# Patient Record
(demographics unavailable — no encounter records)

---

## 2024-10-08 NOTE — PHYSICAL EXAM
[Chaperone Present] : A chaperone was present in the examining room during all aspects of the physical examination [TextEntry] : ***General*** General Appearance: normal; Judgment and insight: normal; the patient is oriented to time, place and person; Recent and remote memory: normal; Mood and affect: normal; Respiratory effort: normal.  ***Pelvic Examination*** External genitalia: the appearance and hair distribution are normal; no lesions are appreciated. Urethra/urethral meatus: no masses or tenderness are appreciated; there is no scarring noted. Bladder: nontender; there is no fullness appreciated; no masses were palpated. Vagina: the vagina is normally rugated; a scanty discharge is seen; no lesions are seen; pelvic support is adequate without any evidence of cystocele or rectocele. Cervix: normal in appearance; no overt lesions are seen. Uterus: Anteverted; normal in size, mobile, nontender, and well supported. Adnexa/parametria: nontender and not enlarged; no masses are palpated. A stool specimen was not indicated at this time.  ***Vaginal Discharge Evaluation*** A saline wet mount and KOH slide evaluation of the vaginal discharge were done. The discharge was scanty; the pH was normal; the whiff test was negative; clue cells were not seen; hyphae were not seen; Lactobacilli were seen; a few white blood cells were seen; superficial cells were seen; a candida culture was sent.  ***colposcopy of the vulva*** Colposcopy of the external genitalia showed that the labia majora and labia minora were normal. She identified the introitus as the location of her pain/itching/irritation. There was no vestibular tenderness of the anterior minor vestibular glands, and no vestibular tenderness of the posterior minor vestibular glands. There was no evidence of other dermatopathology. There was mild erythema of the introitus.

## 2024-10-08 NOTE — ASSESSMENT
[TextEntry] : The patient has a history of vulvar itching and burning.  At least some of her yeast cultures were positive but most recent cultures were negative.  She minimized (but not completely eliminated) irritants and her itching mostly improved.  She continues to complain of some itching but it is not disruptive. 22 minutes of total time was spent on the date of the encounter. This included time for review of medical records and laboratory results, face to face time (including the physical examination counseling and coordination of care), time for patient education, treatment plans, answering questions, communicating with other doctors and for medical record documentation.  The time spent is separate from time spent on separately billed procedures.

## 2024-10-08 NOTE — HISTORY OF PRESENT ILLNESS
[FreeTextEntry1] : The patient has had vulvar itching and burning for the past few months without a vaginal discharge or odor. Initially, cultures were positive for Candida and she was treated with 3 doses of fluconazole on 3 separate occasions including once weekly fluconazole suppression (last dose 10 days prior to her last visit). But her most recent cultures were negative for Candida (including the one done at her last visit). The patient nevertheless was treated with Terazol 3. There is no evidence of Candida on wet prep or culture.  She minimized but did not completely eliminate irritants (she sweats a lot and felt that she would not be clean if she got rid of soap).  She is mostly better but continues to have occasional mild itching.

## 2024-10-08 NOTE — PLAN
[FreeTextEntry1] : I recommended that she continue to try to eliminate the remaining irritants that she has not already eliminated (primarily soap).  She should call for the results of her yeast culture in 1 week.  She should return as needed.

## 2025-04-16 NOTE — ASSESSMENT
[FreeTextEntry1] : Recent increase severe pain for past few weeks with diff bearing wt. Using cane SHe has been doing a hep and taking nsaids with no relief. Pain is progressing with diff moving  We discussed case and findings.  She is indicated for an MRI to eval for stress fracture, oa, sce and ocd SHe will fu after  Diclofenac oral and topical for pain Conside accupuncture

## 2025-04-16 NOTE — PHYSICAL EXAM
[Right] : right foot and ankle [] : not able to perform single heel raise [FreeTextEntry9] : guarding limiting exam [de-identified] : limited due to pain

## 2025-04-16 NOTE — HISTORY OF PRESENT ILLNESS
[10] : 10 [Dull/Aching] : dull/aching [Throbbing] : throbbing [de-identified] :  04/16/2025: patient states she has pain in her right ankle and foot since last week, wb in sneakers, no recent injuries   States pain after fx years ago. SHe does not have rsd, just flares with weather changes. Recent increase severe pain for past few weeks with diff bearing wt. Using cane SHe has been doing a hep and taking nsaids with no relief. Pain is progressing with diff moving  [FreeTextEntry1] : right ankle/ foot

## 2025-04-29 NOTE — DISCUSSION/SUMMARY
[de-identified] : Possible CRPS  Will get EMG to evaluate for neuropathy.  Lidoderm patches sent to pharmacy. OOW note at this time.

## 2025-04-29 NOTE — PHYSICAL EXAM
[NL (40)] : plantar flexion 40 degrees [NL (20)] : eversion 20 degrees [2+] : posterior tibialis pulse: 2+ [] : ambulation with cane [Right] : right ankle [Weight -] : weightbearing [There are no fractures, subluxations or dislocations. No significant abnormalities are seen] : There are no fractures, subluxations or dislocations. No significant abnormalities are seen [FreeTextEntry8] : Hypersensitivity lateral foot/ankle [TWNoteComboBox7] : dorsiflexion 10 degrees [de-identified] : inversion 20 degrees

## 2025-04-29 NOTE — DATA REVIEWED
[MRI] : MRI [Right] : of the right [Ankle] : ankle [I independently reviewed and interpreted images and report] : I independently reviewed and interpreted images and report [I reviewed the films/CD] : I reviewed the films/CD [FreeTextEntry1] : [facility] 4/23/25: Postoperative changes with evidence of hardware removal, mild chronic ligament sprains, mild dorsal TN arthrosis, no osteochondral injury.

## 2025-04-29 NOTE — HISTORY OF PRESENT ILLNESS
[de-identified] : 4/29/2025:  patient states she has pain in her right ankle and foot for last 3 weeks. Denies trauma or injury. History ORIF ankle years ago with subsequent removal of hardware. Describes symptoms chiefly as burning. Has been previously evaluated but not since roughly 2019. Has previously had an injection and was discussed fusion. She was evaluated at [facility].  with sneakers, using a cane.

## 2025-05-13 NOTE — HISTORY OF PRESENT ILLNESS
[de-identified] : 4/29/2025:  patient states she has pain in her right ankle and foot for last 3 weeks. Denies trauma or injury. History ORIF ankle years ago with subsequent removal of hardware. Describes symptoms chiefly as burning. Has been previously evaluated but not since roughly 2019. Has previously had an injection and was discussed fusion. She was evaluated at [facility]. WB with sneakers, using a cane.   05.13.25: Patient is here following up the Rt foot. Pt here today to review EMG results. Lidoderm patc helpful WB in flat dress shoes

## 2025-05-13 NOTE — DISCUSSION/SUMMARY
[de-identified] : Cont Lidoderm  Pain mgt referral- consider lumbar sympathertic block versus selective L4-5 block

## 2025-05-13 NOTE — PHYSICAL EXAM
[NL (40)] : plantar flexion 40 degrees [NL (20)] : eversion 20 degrees [2+] : posterior tibialis pulse: 2+ [] : ambulation with cane [Right] : right ankle [Weight -] : weightbearing [There are no fractures, subluxations or dislocations. No significant abnormalities are seen] : There are no fractures, subluxations or dislocations. No significant abnormalities are seen [FreeTextEntry8] : Hypersensitivity lateral foot/ankle [TWNoteComboBox7] : dorsiflexion 10 degrees [de-identified] : inversion 20 degrees

## 2025-06-19 NOTE — PHYSICAL EXAM
[de-identified] : Constitutional; Appears well, no apparent distress Ability to communicate: Normal  Respiratory: non-labored breathing Skin: No rash noted Head: Normocephalic, atraumatic Neck: no visible thyroid enlargement Eyes: Extraocular movements intact Neurologic: Alert and oriented x3 Psychiatric: normal mood, affect and behavior Right ankle: no allodynia, no temp/color changes, no skin changes, no edema

## 2025-06-19 NOTE — DISCUSSION/SUMMARY
[de-identified] : After discussing various treatment options with the patient including but not limited to oral medications, physical therapy, exercise, as well as interventional spinal injections, we have decided with the following plan:  - Continue home exercises, stretching, activity modification, physical therapy, and conservative care. - Will order lumbar MRI to rule out HNP. Please let this note serve as a formal request for authorization to perform a MRI of their Lumbar Spine. - Follow-up post diagnostic imaging to review and discuss further treatment. - Can consider RIGHT Lumbar sympathetic block although at this time does not meet diagnostic criteria for CRPS.  - Can also consider RIGHT TFESI depending on MRI results.